# Patient Record
Sex: MALE | HISPANIC OR LATINO | Employment: UNEMPLOYED | URBAN - NONMETROPOLITAN AREA
[De-identification: names, ages, dates, MRNs, and addresses within clinical notes are randomized per-mention and may not be internally consistent; named-entity substitution may affect disease eponyms.]

---

## 2022-12-29 ENCOUNTER — HOSPITAL ENCOUNTER (EMERGENCY)
Facility: HOSPITAL | Age: 32
Discharge: HOME | End: 2022-12-29
Admitting: STUDENT IN AN ORGANIZED HEALTH CARE EDUCATION/TRAINING PROGRAM
Payer: MEDICAID

## 2022-12-29 ENCOUNTER — TELEPHONE (OUTPATIENT)
Dept: URGENT CARE | Facility: CLINIC | Age: 32
End: 2022-12-29

## 2022-12-29 VITALS
OXYGEN SATURATION: 100 % | SYSTOLIC BLOOD PRESSURE: 132 MMHG | TEMPERATURE: 98.6 F | HEART RATE: 100 BPM | DIASTOLIC BLOOD PRESSURE: 98 MMHG | WEIGHT: 138 LBS | HEIGHT: 69 IN | RESPIRATION RATE: 16 BRPM | BODY MASS INDEX: 20.44 KG/M2

## 2022-12-29 DIAGNOSIS — A53.9 SYPHILIS: Primary | ICD-10-CM

## 2022-12-29 LAB
ALBUMIN SERPL-MCNC: 3.7 G/DL (ref 3.5–5.2)
ALBUMIN/GLOB SERPL: 1.1 G/DL
ALP SERPL-CCNC: 128 U/L (ref 39–117)
ALT SERPL W P-5'-P-CCNC: 16 U/L (ref 1–41)
AMPHET+METHAMPHET UR QL: NEGATIVE
AMPHETAMINES UR QL: NEGATIVE
ANION GAP SERPL CALCULATED.3IONS-SCNC: 7 MMOL/L (ref 5–15)
AST SERPL-CCNC: 14 U/L (ref 1–40)
BACTERIA UR QL AUTO: ABNORMAL /HPF
BARBITURATES UR QL SCN: NEGATIVE
BASOPHILS # BLD AUTO: 0.04 10*3/MM3 (ref 0–0.2)
BASOPHILS NFR BLD AUTO: 0.4 % (ref 0–1.5)
BENZODIAZ UR QL SCN: NEGATIVE
BILIRUB SERPL-MCNC: 0.4 MG/DL (ref 0–1.2)
BILIRUB UR QL STRIP: NEGATIVE
BUN SERPL-MCNC: 7 MG/DL (ref 6–20)
BUN/CREAT SERPL: 10.1 (ref 7–25)
BUPRENORPHINE SERPL-MCNC: NEGATIVE NG/ML
CALCIUM SPEC-SCNC: 9 MG/DL (ref 8.6–10.5)
CANNABINOIDS SERPL QL: NEGATIVE
CHLORIDE SERPL-SCNC: 100 MMOL/L (ref 98–107)
CLARITY UR: ABNORMAL
CO2 SERPL-SCNC: 30 MMOL/L (ref 22–29)
COCAINE UR QL: NEGATIVE
COLOR UR: YELLOW
CREAT SERPL-MCNC: 0.69 MG/DL (ref 0.76–1.27)
DEPRECATED RDW RBC AUTO: 42.4 FL (ref 37–54)
EGFRCR SERPLBLD CKD-EPI 2021: 127.7 ML/MIN/1.73
EOSINOPHIL # BLD AUTO: 0.19 10*3/MM3 (ref 0–0.4)
EOSINOPHIL NFR BLD AUTO: 1.8 % (ref 0.3–6.2)
ERYTHROCYTE [DISTWIDTH] IN BLOOD BY AUTOMATED COUNT: 12.8 % (ref 12.3–15.4)
GLOBULIN UR ELPH-MCNC: 3.4 GM/DL
GLUCOSE SERPL-MCNC: 127 MG/DL (ref 65–99)
GLUCOSE UR STRIP-MCNC: NEGATIVE MG/DL
HCT VFR BLD AUTO: 42.6 % (ref 37.5–51)
HGB BLD-MCNC: 13.8 G/DL (ref 13–17.7)
HGB UR QL STRIP.AUTO: ABNORMAL
HYALINE CASTS UR QL AUTO: ABNORMAL /LPF
IMM GRANULOCYTES # BLD AUTO: 0.04 10*3/MM3 (ref 0–0.05)
IMM GRANULOCYTES NFR BLD AUTO: 0.4 % (ref 0–0.5)
KETONES UR QL STRIP: NEGATIVE
LEUKOCYTE ESTERASE UR QL STRIP.AUTO: NEGATIVE
LYMPHOCYTES # BLD AUTO: 1.9 10*3/MM3 (ref 0.7–3.1)
LYMPHOCYTES NFR BLD AUTO: 18.4 % (ref 19.6–45.3)
MCH RBC QN AUTO: 29.4 PG (ref 26.6–33)
MCHC RBC AUTO-ENTMCNC: 32.4 G/DL (ref 31.5–35.7)
MCV RBC AUTO: 90.6 FL (ref 79–97)
METHADONE UR QL SCN: NEGATIVE
MONOCYTES # BLD AUTO: 1.04 10*3/MM3 (ref 0.1–0.9)
MONOCYTES NFR BLD AUTO: 10.1 % (ref 5–12)
NEUTROPHILS NFR BLD AUTO: 68.9 % (ref 42.7–76)
NEUTROPHILS NFR BLD AUTO: 7.1 10*3/MM3 (ref 1.7–7)
NITRITE UR QL STRIP: NEGATIVE
NRBC BLD AUTO-RTO: 0 /100 WBC (ref 0–0.2)
OPIATES UR QL: NEGATIVE
OXYCODONE UR QL SCN: NEGATIVE
PCP UR QL SCN: NEGATIVE
PH UR STRIP.AUTO: 8 [PH] (ref 5–8)
PLATELET # BLD AUTO: 316 10*3/MM3 (ref 140–450)
PMV BLD AUTO: 10.5 FL (ref 6–12)
POTASSIUM SERPL-SCNC: 4 MMOL/L (ref 3.5–5.2)
PROPOXYPH UR QL: NEGATIVE
PROT SERPL-MCNC: 7.1 G/DL (ref 6–8.5)
PROT UR QL STRIP: NEGATIVE
RBC # BLD AUTO: 4.7 10*6/MM3 (ref 4.14–5.8)
RBC # UR STRIP: ABNORMAL /HPF
REF LAB TEST METHOD: ABNORMAL
SODIUM SERPL-SCNC: 137 MMOL/L (ref 136–145)
SP GR UR STRIP: 1.01 (ref 1–1.03)
SQUAMOUS #/AREA URNS HPF: ABNORMAL /HPF
TRICYCLICS UR QL SCN: NEGATIVE
UROBILINOGEN UR QL STRIP: ABNORMAL
WBC # UR STRIP: ABNORMAL /HPF
WBC NRBC COR # BLD: 10.31 10*3/MM3 (ref 3.4–10.8)

## 2022-12-29 PROCEDURE — 80306 DRUG TEST PRSMV INSTRMNT: CPT | Performed by: NURSE PRACTITIONER

## 2022-12-29 PROCEDURE — 87591 N.GONORRHOEAE DNA AMP PROB: CPT | Performed by: NURSE PRACTITIONER

## 2022-12-29 PROCEDURE — 85025 COMPLETE CBC W/AUTO DIFF WBC: CPT | Performed by: NURSE PRACTITIONER

## 2022-12-29 PROCEDURE — 99283 EMERGENCY DEPT VISIT LOW MDM: CPT

## 2022-12-29 PROCEDURE — 87491 CHLMYD TRACH DNA AMP PROBE: CPT | Performed by: NURSE PRACTITIONER

## 2022-12-29 PROCEDURE — 36415 COLL VENOUS BLD VENIPUNCTURE: CPT

## 2022-12-29 PROCEDURE — 81001 URINALYSIS AUTO W/SCOPE: CPT | Performed by: NURSE PRACTITIONER

## 2022-12-29 PROCEDURE — 80053 COMPREHEN METABOLIC PANEL: CPT | Performed by: NURSE PRACTITIONER

## 2022-12-29 NOTE — ED PROVIDER NOTES
Subjective   History of Present Illness  Patient is a 32-year-old male who presents to the ER with complaints of an STD.  Patient states 2 and half weeks ago he broke out in a rash and was evaluated at an ER in [place].  He states they did labs and testing.  During this time patient states he was essentially unavailable because he admits to having a longstanding history of IV drug use.  He states he is currently here in the area at [place] for drug rehab and reports he arrived at [place] this morning at 7 AM.  Last IV drug use was 2 days ago.  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  He also smokes marijuana.  Last used IV drugs 2 days ago.  Patient states his leonore received a phone call today from the ER/clinic stating that patient tested positive for syphilis stage II and felt he needed to go to the nearest ER or clinic for treatment.  Patient states he is currently on antibiotics that was prescribed when he was evaluated previously.  He states the rash has greatly improved however it remains on his penis.  He continues to have discharge from his penis and reports dysuria.  He also has hematuria at times and states he has had a longstanding history of blood in the stool secondary to hemorrhoids.  He states he was tested for HIV last Thursday which was negative.  He admits to unprotected intercourse with various partners, states he only has intercourse with females, denies any anal intercourse.  Patient denies any history of alcohol abuse.  He states he graduated from [facility] around January of this year however fell back into IV drug use and wanted to get some help again.  Due to symptoms described above he came to the ER for evaluation and treatment.  Currently records are unavailable.  Past medical history significant for ADHD, anxiety, bipolar, depression, IV drug use.        Review of Systems    Past Medical History:   Diagnosis Date   • ADHD    •  Anxiety    • Bipolar 1 disorder (HCC)    • Depression        No Known Allergies    History reviewed. No pertinent surgical history.    History reviewed. No pertinent family history.    Social History     Socioeconomic History   • Marital status: Single   Tobacco Use   • Smoking status: Every Day     Packs/day: 0.50     Types: Cigarettes   • Smokeless tobacco: Current     Types: Snuff   Substance and Sexual Activity   • Alcohol use: Not Currently   • Drug use: Not Currently           Objective   Physical Exam    Procedures           ED Course   Labs Reviewed   COMPREHENSIVE METABOLIC PANEL - Abnormal; Notable for the following components:       Result Value    Glucose 127 (*)     Creatinine 0.69 (*)     CO2 30.0 (*)     Alkaline Phosphatase 128 (*)     All other components within normal limits    Narrative:     GFR Normal >60  Chronic Kidney Disease <60  Kidney Failure <15     URINALYSIS W/ CULTURE IF INDICATED - Abnormal; Notable for the following components:    Appearance, UA Cloudy (*)     Blood, UA Trace (*)     All other components within normal limits    Narrative:     In absence of clinical symptoms, the presence of pyuria, bacteria, and/or nitrites on the urinalysis result does not correlate with infection.   CBC WITH AUTO DIFFERENTIAL - Abnormal; Notable for the following components:    Lymphocyte % 18.4 (*)     Neutrophils, Absolute 7.10 (*)     Monocytes, Absolute 1.04 (*)     All other components within normal limits   URINALYSIS, MICROSCOPIC ONLY - Abnormal; Notable for the following components:    RBC, UA 6-12 (*)     WBC, UA 0-2 (*)     All other components within normal limits   URINE DRUG SCREEN - Normal    Narrative:     Cutoff For Drugs Screened:    Amphetamines               500 ng/ml  Barbiturates               200 ng/ml  Benzodiazepines            150 ng/ml  Cocaine                    150 ng/ml  Methadone                  200 ng/ml  Opiates                    100 ng/ml  Phencyclidine                25 ng/ml  THC                            50 ng/ml  Methamphetamine            500 ng/ml  Tricyclic Antidepressants  300 ng/ml  Oxycodone                  100 ng/ml  Propoxyphene               300 ng/ml  Buprenorphine               10 ng/ml    The normal value for all drugs tested is negative. This report includes unconfirmed screening results, with the cutoff values listed, to be used for medical treatment purposes only.  Unconfirmed results must not be used for non-medical purposes such as employment or legal testing.  Clinical consideration should be applied to any drug of abuse test, particularly when unconfirmed results are used.     CHLAMYDIA TRACHOMATIS, NEISSERIA GONORRHOEAE, PCR   CBC AND DIFFERENTIAL    Narrative:     The following orders were created for panel order CBC & Differential.  Procedure                               Abnormality         Status                     ---------                               -----------         ------                     CBC Auto Differential[[id number]]        Abnormal            Final result                 Please view results for these tests on the individual orders.     ED Course as of 12/29/22 2238   Thu Dec 29, 2022   1905 Patient had a lengthy stay in the emergency department due to inability of getting records from [facility].  Finally was able to speak with house supervisor who was able to give me information regarding this patient.  Patient was evaluated on December 14, 2022 at an urgent care and sent to their ER for possible measles.  Patient had a chest x-ray which was negative.  He was diagnosed with viral urticaria and prescribed steroids as well as an antihistamine.  He had blood cultures which remain negative.  Patient had a reactive RPR with a quantitative level of 1.32.  This hospital's fax machine has been messed up therefore we were unable to get records however I did get verbal results from house supervisor.  Spoke with Dr. Coy  Ford with infectious disease who recommends for patient to go to the health department so that he will get a treatment plan and he can be closely followed.  She recommends for patient to be seen at [facility] who may contact [facility] and get all of his records and begin his treatment plan process.  Patient has no symptoms of neurosyphilis.  He has no visual changes or other neurological deficits.  Patient will be discharged home shortly in stable condition. [TW]      ED Course User Index  [TW] Jayla Dhillon APRN                                           Medical Decision Making  Patient is a 32-year-old male who presents to the ER with complaints of an STD.  Patient states 2 and half weeks ago he broke out in a rash and was evaluated at an ER in [place].  He states they did labs and testing.  He states he is currently here in the area at [place] for drug rehab and reports he arrived at [place] this morning at 7 AM.  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         Syphilis: acute illness or injury  Amount and/or Complexity of Data Reviewed  Labs: ordered.     Details: labs were unremarkable  wbc within normal limits, urinalysis negative for infection, electrolytes within normal limits, uds within normal limits  Discussion of management or test interpretation with external provider(s): Discussed case with Dr. Teodoro Duggan on call for ID. She recommends for patient to f/u with our health dept tomorrow who can get in contact with [facility] health dept. This way we will have official records and be able to record/trace patient's treatment and get in touch with patient's contacts. No medication recommended for treatment at this time without official labs reviewed. We had issues and were never  able to get labs faxed to this hospital. Patient's treatment will be provided thru the health dept. He has no neurological deficits to suggest neuro syphilis.     Risk  Risk Details: Patient has hx of iv drug use however he is currently in a treatment facility that can make sure patient is following up with health dept for his treatment recommended by ID.         Final diagnoses:   Syphilis       ED Disposition  ED Disposition     ED Disposition   Discharge    Condition   Good    Comment   --             No follow-up provider specified.       Medication List      No changes were made to your prescriptions during this visit.          Jayla Dhillon, APRN  12/29/22 3850

## 2022-12-29 NOTE — TELEPHONE ENCOUNTER
Patient states he was sent to urgent care from [facility] to receive treatment for confirmed syphilis diagnosis.  Instructed patient he would have to be seen in the emergency room due to not having the required medication in office.  Patient agrees to treatment plan.

## 2022-12-30 LAB
C TRACH RRNA CVX QL NAA+PROBE: NOT DETECTED
N GONORRHOEA RRNA SPEC QL NAA+PROBE: NOT DETECTED

## 2022-12-30 NOTE — DISCHARGE INSTRUCTIONS
Our infectious disease doctor recommends for [place] to contact the [facility] here in [place]. Let them know they will need to contact [facility] to get all of your records. You will need a documented treatment plan and tracking of your treatment including your contacts. This is done thru the health dept and not in the ER.

## 2023-10-17 ENCOUNTER — HOSPITAL ENCOUNTER (EMERGENCY)
Facility: HOSPITAL | Age: 33
Discharge: HOME | End: 2023-10-17
Admitting: EMERGENCY MEDICINE
Payer: MEDICAID

## 2023-10-17 ENCOUNTER — APPOINTMENT (OUTPATIENT)
Dept: CT IMAGING | Facility: HOSPITAL | Age: 33
End: 2023-10-17
Payer: MEDICAID

## 2023-10-17 VITALS
RESPIRATION RATE: 18 BRPM | DIASTOLIC BLOOD PRESSURE: 72 MMHG | WEIGHT: 142.5 LBS | HEIGHT: 69 IN | OXYGEN SATURATION: 100 % | TEMPERATURE: 97.9 F | BODY MASS INDEX: 21.11 KG/M2 | SYSTOLIC BLOOD PRESSURE: 119 MMHG | HEART RATE: 69 BPM

## 2023-10-17 DIAGNOSIS — B34.9 VIRAL ILLNESS: ICD-10-CM

## 2023-10-17 DIAGNOSIS — R11.2 NAUSEA AND VOMITING, UNSPECIFIED VOMITING TYPE: Primary | ICD-10-CM

## 2023-10-17 DIAGNOSIS — N28.1 RENAL CYST: ICD-10-CM

## 2023-10-17 LAB
ALBUMIN SERPL-MCNC: 4.5 G/DL (ref 3.5–5.2)
ALBUMIN/GLOB SERPL: 1.6 G/DL
ALP SERPL-CCNC: 79 U/L (ref 39–117)
ALT SERPL W P-5'-P-CCNC: 6 U/L (ref 1–41)
AMYLASE SERPL-CCNC: 37 U/L (ref 28–100)
ANION GAP SERPL CALCULATED.3IONS-SCNC: 9 MMOL/L (ref 5–15)
AST SERPL-CCNC: 14 U/L (ref 1–40)
BASOPHILS # BLD MANUAL: 0.05 10*3/MM3 (ref 0–0.2)
BASOPHILS NFR BLD MANUAL: 1 % (ref 0–1.5)
BILIRUB SERPL-MCNC: 1.4 MG/DL (ref 0–1.2)
BUN SERPL-MCNC: 12 MG/DL (ref 6–20)
BUN/CREAT SERPL: 15.4 (ref 7–25)
CALCIUM SPEC-SCNC: 9.4 MG/DL (ref 8.6–10.5)
CHLORIDE SERPL-SCNC: 101 MMOL/L (ref 98–107)
CO2 SERPL-SCNC: 28 MMOL/L (ref 22–29)
CREAT SERPL-MCNC: 0.78 MG/DL (ref 0.76–1.27)
D-LACTATE SERPL-SCNC: 1 MMOL/L (ref 0.5–2)
DEPRECATED RDW RBC AUTO: 40.5 FL (ref 37–54)
EGFRCR SERPLBLD CKD-EPI 2021: 122.3 ML/MIN/1.73
EOSINOPHIL # BLD MANUAL: 0.05 10*3/MM3 (ref 0–0.4)
EOSINOPHIL NFR BLD MANUAL: 1 % (ref 0.3–6.2)
ERYTHROCYTE [DISTWIDTH] IN BLOOD BY AUTOMATED COUNT: 12.2 % (ref 12.3–15.4)
FLUAV RNA RESP QL NAA+PROBE: NOT DETECTED
FLUBV RNA RESP QL NAA+PROBE: NOT DETECTED
GLOBULIN UR ELPH-MCNC: 2.8 GM/DL
GLUCOSE SERPL-MCNC: 97 MG/DL (ref 65–99)
HCT VFR BLD AUTO: 50.4 % (ref 37.5–51)
HGB BLD-MCNC: 16.8 G/DL (ref 13–17.7)
LIPASE SERPL-CCNC: 27 U/L (ref 13–60)
LYMPHOCYTES # BLD MANUAL: 2.88 10*3/MM3 (ref 0.7–3.1)
LYMPHOCYTES NFR BLD MANUAL: 7.1 % (ref 5–12)
MCH RBC QN AUTO: 30.3 PG (ref 26.6–33)
MCHC RBC AUTO-ENTMCNC: 33.3 G/DL (ref 31.5–35.7)
MCV RBC AUTO: 90.8 FL (ref 79–97)
MONOCYTES # BLD: 0.38 10*3/MM3 (ref 0.1–0.9)
NEUTROPHILS # BLD AUTO: 1.96 10*3/MM3 (ref 1.7–7)
NEUTROPHILS NFR BLD MANUAL: 35.4 % (ref 42.7–76)
NEUTS BAND NFR BLD MANUAL: 1 % (ref 0–5)
PLASMA CELL PREC NFR BLD MANUAL: 1 % (ref 0–0)
PLAT MORPH BLD: NORMAL
PLATELET # BLD AUTO: 236 10*3/MM3 (ref 140–450)
PMV BLD AUTO: 11.4 FL (ref 6–12)
POTASSIUM SERPL-SCNC: 4.4 MMOL/L (ref 3.5–5.2)
PROT SERPL-MCNC: 7.3 G/DL (ref 6–8.5)
RBC # BLD AUTO: 5.55 10*6/MM3 (ref 4.14–5.8)
RBC MORPH BLD: NORMAL
RSV RNA NPH QL NAA+NON-PROBE: NOT DETECTED
SARS-COV-2 RNA RESP QL NAA+PROBE: NOT DETECTED
SODIUM SERPL-SCNC: 138 MMOL/L (ref 136–145)
VARIANT LYMPHS NFR BLD MANUAL: 44.4 % (ref 19.6–45.3)
VARIANT LYMPHS NFR BLD MANUAL: 9.1 % (ref 0–5)
WBC MORPH BLD: NORMAL
WBC NRBC COR # BLD: 5.38 10*3/MM3 (ref 3.4–10.8)

## 2023-10-17 PROCEDURE — 83605 ASSAY OF LACTIC ACID: CPT | Performed by: NURSE PRACTITIONER

## 2023-10-17 PROCEDURE — 25810000003 SODIUM CHLORIDE 0.9 % SOLUTION: Performed by: NURSE PRACTITIONER

## 2023-10-17 PROCEDURE — 83690 ASSAY OF LIPASE: CPT | Performed by: NURSE PRACTITIONER

## 2023-10-17 PROCEDURE — 96374 THER/PROPH/DIAG INJ IV PUSH: CPT

## 2023-10-17 PROCEDURE — 99285 EMERGENCY DEPT VISIT HI MDM: CPT

## 2023-10-17 PROCEDURE — 74177 CT ABD & PELVIS W/CONTRAST: CPT

## 2023-10-17 PROCEDURE — 85025 COMPLETE CBC W/AUTO DIFF WBC: CPT | Performed by: NURSE PRACTITIONER

## 2023-10-17 PROCEDURE — 82150 ASSAY OF AMYLASE: CPT | Performed by: NURSE PRACTITIONER

## 2023-10-17 PROCEDURE — 25510000001 IOPAMIDOL 61 % SOLUTION: Performed by: NURSE PRACTITIONER

## 2023-10-17 PROCEDURE — 87637 SARSCOV2&INF A&B&RSV AMP PRB: CPT | Performed by: NURSE PRACTITIONER

## 2023-10-17 PROCEDURE — 80053 COMPREHEN METABOLIC PANEL: CPT | Performed by: NURSE PRACTITIONER

## 2023-10-17 PROCEDURE — 25010000002 ONDANSETRON PER 1 MG: Performed by: NURSE PRACTITIONER

## 2023-10-17 PROCEDURE — 36415 COLL VENOUS BLD VENIPUNCTURE: CPT

## 2023-10-17 PROCEDURE — 85007 BL SMEAR W/DIFF WBC COUNT: CPT | Performed by: NURSE PRACTITIONER

## 2023-10-17 RX ORDER — ONDANSETRON 4 MG/1
4 TABLET, ORALLY DISINTEGRATING ORAL EVERY 6 HOURS PRN
Qty: 10 TABLET | Refills: 0 | Status: SHIPPED | OUTPATIENT
Start: 2023-10-17

## 2023-10-17 RX ORDER — ONDANSETRON 2 MG/ML
4 INJECTION INTRAMUSCULAR; INTRAVENOUS ONCE
Status: COMPLETED | OUTPATIENT
Start: 2023-10-17 | End: 2023-10-17

## 2023-10-17 RX ADMIN — SODIUM CHLORIDE 1000 ML: 9 INJECTION, SOLUTION INTRAVENOUS at 12:48

## 2023-10-17 RX ADMIN — IOPAMIDOL 100 ML: 612 INJECTION, SOLUTION INTRAVENOUS at 14:35

## 2023-10-17 RX ADMIN — ONDANSETRON 4 MG: 2 INJECTION INTRAMUSCULAR; INTRAVENOUS at 12:45

## 2023-10-17 NOTE — DISCHARGE INSTRUCTIONS
Return to ER if symptoms worsen   Clear liquids only for 24 hours, then advance as tolerated  Follow up with primary care provider in 2 days

## 2023-10-17 NOTE — ED PROVIDER NOTES
Subjective   History of Present Illness  Patient is 33-year-old male presents the emergency department with right lower quadrant abdominal pain and nausea with vomiting.  He states that he started feeling nauseous yesterday and did not feel good.  He went to work today started feeling nauseated and dizzy and vomited.  He states he was having abdominal pain as well.  He was seen in urgent care and sent to the emergency department for further evaluation and treatment.  He denies any fever or chills.  No dysuria.  He denies any diarrhea.  No cough or congestion.    History provided by:  Patient   used: No        Review of Systems   Constitutional: Negative.    HENT: Negative.     Eyes: Negative.    Respiratory: Negative.     Cardiovascular: Negative.    Gastrointestinal:         Patient is 33-year-old male presents the emergency department with right lower quadrant abdominal pain and nausea with vomiting.  He states that he started feeling nauseous yesterday and did not feel good.  He went to work today started feeling nauseated and dizzy and vomited.  He states he was having abdominal pain as well.  He was seen in urgent care and sent to the emergency department for further evaluation and treatment.  He denies any fever or chills.  No dysuria.  He denies any diarrhea.  No cough or congestion.     Endocrine: Negative.    Genitourinary: Negative.    Musculoskeletal: Negative.    Skin: Negative.    Allergic/Immunologic: Negative.    Neurological: Negative.    Hematological: Negative.    Psychiatric/Behavioral: Negative.     All other systems reviewed and are negative.      Past Medical History:   Diagnosis Date    ADHD     Anxiety     Bipolar 1 disorder     Depression        No Known Allergies    History reviewed. No pertinent surgical history.    History reviewed. No pertinent family history.    Social History     Socioeconomic History    Marital status: Single   Tobacco Use    Smoking status: Former  "    Packs/day: .5     Types: Cigarettes    Smokeless tobacco: Former   Vaping Use    Vaping Use: Some days    Substances: Flavoring    Devices: Disposable   Substance and Sexual Activity    Alcohol use: Not Currently    Drug use: Not Currently       Prior to Admission medications    Medication Sig Start Date End Date Taking? Authorizing Provider   nicotine (NICODERM CQ) 21 MG/24HR patch Place 1 patch on the skin as directed by provider Daily. 3/22/23   ProviderCordell MD   naltrexone (DEPADE) 50 MG tablet Take 1 tablet by mouth Daily. 3/22/23 10/17/23  ProviderCordell MD   ondansetron ODT (ZOFRAN-ODT) 8 MG disintegrating tablet Place 1 tablet on the tongue Every 8 (Eight) Hours As Needed for Nausea or Vomiting. 8/9/23 10/17/23  Destiny Mehta APRN   prazosin (MINIPRESS) 1 MG capsule Take 1 capsule by mouth Every Night.  10/17/23  Emergency, Nurse Katherine, RN       /72   Pulse 69   Temp 97.9 °F (36.6 °C) (Axillary)   Resp 18   Ht 175.3 cm (69\")   Wt 64.6 kg (142 lb 8 oz)   SpO2 100%   BMI 21.04 kg/m²     Objective   Physical Exam  Vitals and nursing note reviewed.   Constitutional:       Appearance: He is well-developed.      Comments: Nontoxic-appearing.  No acute distress.   HENT:      Head: Normocephalic and atraumatic.   Eyes:      Conjunctiva/sclera: Conjunctivae normal.      Pupils: Pupils are equal, round, and reactive to light.   Cardiovascular:      Rate and Rhythm: Normal rate and regular rhythm.      Heart sounds: Normal heart sounds.   Pulmonary:      Effort: Pulmonary effort is normal.      Breath sounds: Normal breath sounds.   Abdominal:      General: Bowel sounds are normal.      Palpations: Abdomen is soft.      Comments: Abdomen is soft, nondistended.  Has tenderness in the right lower quadrant abdomen.  There is no guarding or rebound.   Musculoskeletal:         General: Normal range of motion.      Cervical back: Normal range of motion and neck supple.   Skin:    "  General: Skin is warm and dry.      Comments: Mild pallor noted   Neurological:      Mental Status: He is alert and oriented to person, place, and time.      Deep Tendon Reflexes: Reflexes are normal and symmetric.   Psychiatric:         Behavior: Behavior normal.         Thought Content: Thought content normal.         Judgment: Judgment normal.         Procedures         Lab Results (last 24 hours)       Procedure Component Value Units Date/Time    COVID PRE-OP / PRE-PROCEDURE SCREENING ORDER (NO ISOLATION) - Swab, Nasopharynx [[id number]]  (Normal) Collected: 10/17/23 1244    Specimen: Swab from Nasopharynx Updated: 10/17/23 1336    Narrative:      The following orders were created for panel order COVID PRE-OP / PRE-PROCEDURE SCREENING ORDER (NO ISOLATION) - Swab, Nasopharynx.  Procedure                               Abnormality         Status                     ---------                               -----------         ------                     COVID-19, FLU A/B, RSV P...[[id number]]  Normal              Final result                 Please view results for these tests on the individual orders.    COVID-19, FLU A/B, RSV PCR - Swab, Nasopharynx [[id number]]  (Normal) Collected: 10/17/23 1244    Specimen: Swab from Nasopharynx Updated: 10/17/23 1336     COVID19 Not Detected     Influenza A PCR Not Detected     Influenza B PCR Not Detected     RSV, PCR Not Detected    Narrative:      Fact sheet for providers: https://www.fda.gov/media/564692/download    Fact sheet for patients: https://www.fda.gov/media/425353/download    Test performed by PCR.    CBC & Differential [[id number]]  (Abnormal) Collected: 10/17/23 1248    Specimen: Blood Updated: 10/17/23 1320    Narrative:      The following orders were created for panel order CBC & Differential.  Procedure                               Abnormality         Status                     ---------                               -----------         ------                      CBC Auto Differential[[id number]]        Abnormal            Final result                 Please view results for these tests on the individual orders.    CBC Auto Differential [[id number]]  (Abnormal) Collected: 10/17/23 1248    Specimen: Blood Updated: 10/17/23 1320     WBC 5.38 10*3/mm3      RBC 5.55 10*6/mm3      Hemoglobin 16.8 g/dL      Hematocrit 50.4 %      MCV 90.8 fL      MCH 30.3 pg      MCHC 33.3 g/dL      RDW 12.2 %      RDW-SD 40.5 fl      MPV 11.4 fL      Platelets 236 10*3/mm3     Narrative:      The previously reported component NRBC is no longer being reported. Previous result was 0.0 /100 WBC (Reference Range: 0.0-0.2 /100 WBC) on 10/17/2023 at 1302 CDT.    Manual Differential [[id number]]  (Abnormal) Collected: 10/17/23 1248    Specimen: Blood Updated: 10/17/23 1320     Neutrophil % 35.4 %      Lymphocyte % 44.4 %      Monocyte % 7.1 %      Eosinophil % 1.0 %      Basophil % 1.0 %      Bands %  1.0 %      Atypical Lymphocyte % 9.1 %      Plasma Cells % 1.0 %      Neutrophils Absolute 1.96 10*3/mm3      Lymphocytes Absolute 2.88 10*3/mm3      Monocytes Absolute 0.38 10*3/mm3      Eosinophils Absolute 0.05 10*3/mm3      Basophils Absolute 0.05 10*3/mm3      RBC Morphology Normal     WBC Morphology Normal     Platelet Morphology Normal    Comprehensive Metabolic Panel [[id number]]  (Abnormal) Collected: 10/17/23 1253    Specimen: Blood Updated: 10/17/23 1323     Glucose 97 mg/dL      BUN 12 mg/dL      Creatinine 0.78 mg/dL      Sodium 138 mmol/L      Potassium 4.4 mmol/L      Chloride 101 mmol/L      CO2 28.0 mmol/L      Calcium 9.4 mg/dL      Total Protein 7.3 g/dL      Albumin 4.5 g/dL      ALT (SGPT) 6 U/L      AST (SGOT) 14 U/L      Alkaline Phosphatase 79 U/L      Total Bilirubin 1.4 mg/dL      Globulin 2.8 gm/dL      A/G Ratio 1.6 g/dL      BUN/Creatinine Ratio 15.4     Anion Gap 9.0 mmol/L      eGFR 122.3 mL/min/1.73     Narrative:      GFR Normal >60  Chronic Kidney Disease <60  Kidney Failure  <15      Amylase [[id number]]  (Normal) Collected: 10/17/23 1253    Specimen: Blood Updated: 10/17/23 1320     Amylase 37 U/L     Lipase [[id number]]  (Normal) Collected: 10/17/23 1253    Specimen: Blood Updated: 10/17/23 1318     Lipase 27 U/L     Lactic Acid, Plasma [[id number]]  (Normal) Collected: 10/17/23 1253    Specimen: Blood Updated: 10/17/23 1321     Lactate 1.0 mmol/L             CT Abdomen Pelvis With Contrast   Final Result   No acute intra-abdominal or pelvic abnormality is   identified. The appendix appears normal. There is partial contraction of   the gallbladder. Benign-appearing 1.3 cm cyst is noted at the inferior   pole of the right kidney.               This report was signed and finalized on 10/17/2023 2:56 PM CDT by Dr. Boubacar Manuel MD.              ED Course  ED Course as of 10/17/23 1606   Tue Oct 17, 2023   1515 IMPRESSION:  No acute intra-abdominal or pelvic abnormality is  identified. The appendix appears normal. There is partial contraction of  the gallbladder. Benign-appearing 1.3 cm cyst is noted at the inferior  pole of the right kidney.      [CW]   1515 Laboratory studies CMP is unremarkable.  CBC shows no leukocytosis or anemia.  Amylase lipase are within normal limits.  CT of the abdomen pelvis shows no acute intra-abdominal or pelvic abnormality noted.  Appendix is normal.  Partial contraction of the gallbladder.  Benign-appearing 1.3 cm cyst noted in the inferior pole of the right kidney.  COVID-19, influenza, RSV are all negative.  Patient states he is feeling much better after IV fluids and Zofran.  He has had no vomiting since he has been in the emergency department.  I reviewed the results of testing with the patient.  He will be discharged home shortly in stable condition with antiemetics.  Advised patient he most likely has a viral illness.  Advised clear liquids only for 24 hours then advance as tolerated.  Patient be discharged shortly in stable condition.  Advised to  return emergency department if symptoms worsen. [CW]      ED Course User Index  [CW] Sara MAGGI Mcneal        Medical Decision Making  Patient is 33-year-old male presents the emergency department with right lower quadrant abdominal pain and nausea with vomiting.  He states that he started feeling nauseous yesterday and did not feel good.  He went to work today started feeling nauseated and dizzy and vomited.  He states he was having abdominal pain as well.  He was seen in urgent care and sent to the emergency department for further evaluation and treatment.  He denies any fever or chills.  No dysuria.  He denies any diarrhea.  No cough or congestion.  Course of treatment in the er: Well-developed nontoxic-appearing 33-year-old male presents the emergency department with right lower quadrant abdominal pain and nausea with vomiting for the past 2 days.  No diarrhea.  He is nontoxic-appearing.  Lungs are clear to auscultation.  CV is normal sinus rhythm.  Abdomen is soft, nondistended.  He does have tenderness noted in the right lower quadrant abdomen.  No guarding or rebound noted.  Laboratory studies and CT of the abdomen pelvis have been ordered.  Labs Reviewed  COMPREHENSIVE METABOLIC PANEL - Abnormal; Notable for the following components:     Total Bilirubin               1.4 (*)             All other components within normal limits         Narrative: GFR Normal >60                  Chronic Kidney Disease <60                  Kidney Failure <15                    CBC WITH AUTO DIFFERENTIAL - Abnormal; Notable for the following components:     RDW                           12.2 (*)            All other components within normal limits         Narrative: The previously reported component NRBC is no longer being reported. Previous result was 0.0 /100 WBC (Reference Range: 0.0-0.2 /100 WBC) on 10/17/2023 at 1302 CDT.  MANUAL DIFFERENTIAL - Abnormal; Notable for the following components:     Neutrophil %                   35.4 (*)               Atypical Lymphocyte %         9.1 (*)                Plasma Cells %                1.0 (*)             All other components within normal limits  COVID-19/FLUA&B/RSV, NP SWAB IN TRANSPORT MEDIA 8-12 HR TAT - Normal         Narrative: Fact sheet for providers: https://www.fda.gov/media/561976/download                    Fact sheet for patients: https://www.fda.gov/media/960050/download                                    Test performed by PCR.  AMYLASE - Normal  LIPASE - Normal  LACTIC ACID, PLASMA - Normal  COVID PRE-OP / PRE-PROCEDURE SCREENING ORDER (NO ISOLATION)         Narrative: The following orders were created for panel order COVID PRE-OP / PRE-PROCEDURE SCREENING ORDER (NO ISOLATION) - Swab, Nasopharynx.                  Procedure                               Abnormality         Status                                     ---------                               -----------         ------                                     COVID-19, FLU A/B, RSV P...[[id number]]  Normal              Final result                                                 Please view results for these tests on the individual orders.  CBC AND DIFFERENTIAL  CT Abdomen Pelvis With Contrast   Final Result    No acute intra-abdominal or pelvic abnormality is    identified. The appendix appears normal. There is partial contraction of    the gallbladder. Benign-appearing 1.3 cm cyst is noted at the inferior    pole of the right kidney.                   This report was signed and finalized on 10/17/2023 2:56 PM CDT by Dr. Boubacar Manuel MD.          Laboratory studies were unremarkable.  CT of abdomen pelvis showed no acute intra-abdominal abnormality.  Gallbladder was slightly contracted.  There was a renal cyst noted on the right kidney.  Patient states feeling much better after the liter of lactated Ringer's and Zofran.  No further vomiting since being in the emergency department.  Advised the patient  most likely was dealing with a viral illness.  Patient's COVID, influenza, and RSV are all negative as well.  Advised the patient clear liquids only for the next 24 hours then advance diet as tolerated.  Patient was discharged in stable condition.  All questions were answered.  Advised reasons to return the emergency department.  Differential dx: appendicitis; kidney stone; pyelonephritis; colitis; diverticulitis    Problems Addressed:  Nausea and vomiting, unspecified vomiting type: complicated acute illness or injury  Renal cyst: complicated acute illness or injury  Viral illness: complicated acute illness or injury    Amount and/or Complexity of Data Reviewed  Labs: ordered. Decision-making details documented in ED Course.  Radiology: ordered. Decision-making details documented in ED Course.    Risk  Prescription drug management.         Final diagnoses:   Nausea and vomiting, unspecified vomiting type   Viral illness   Renal cyst          Yadira Ruiz, APRN  10/17/23 2073

## 2023-10-17 NOTE — Clinical Note
[facility] EMERGENCY DEPARTMENT  [address]  [facility] [address]  Phone: [phone]    Oren Samaniego was seen and treated in our emergency department on 10/17/2023.  He may return to work on 10/20/2023.         Thank you for choosing [facility].    Yadira Ruiz APRN

## 2024-04-15 ENCOUNTER — APPOINTMENT (OUTPATIENT)
Dept: GENERAL RADIOLOGY | Facility: HOSPITAL | Age: 34
End: 2024-04-15
Payer: MEDICAID

## 2024-04-15 PROCEDURE — 72110 X-RAY EXAM L-2 SPINE 4/>VWS: CPT

## 2024-04-15 PROCEDURE — 72072 X-RAY EXAM THORAC SPINE 3VWS: CPT
